# Patient Record
Sex: MALE | Race: WHITE | URBAN - METROPOLITAN AREA
[De-identification: names, ages, dates, MRNs, and addresses within clinical notes are randomized per-mention and may not be internally consistent; named-entity substitution may affect disease eponyms.]

---

## 2019-09-05 ENCOUNTER — IMMUNIZATION (OUTPATIENT)
Dept: SOCIAL WORK | Facility: CLINIC | Age: 59
End: 2019-09-05
Payer: COMMERCIAL

## 2019-09-05 DIAGNOSIS — Z23 NEED FOR VACCINATION: ICD-10-CM

## 2019-09-05 PROCEDURE — 90686 IIV4 VACC NO PRSV 0.5 ML IM: CPT | Performed by: REGISTERED NURSE

## 2019-09-05 PROCEDURE — 90471 IMMUNIZATION ADMIN: CPT | Performed by: REGISTERED NURSE

## 2023-09-25 ENCOUNTER — OFFICE VISIT (OUTPATIENT)
Dept: URGENT CARE | Facility: CLINIC | Age: 63
End: 2023-09-25
Payer: COMMERCIAL

## 2023-09-25 VITALS
HEART RATE: 89 BPM | HEIGHT: 66 IN | RESPIRATION RATE: 16 BRPM | SYSTOLIC BLOOD PRESSURE: 110 MMHG | TEMPERATURE: 98 F | DIASTOLIC BLOOD PRESSURE: 68 MMHG | OXYGEN SATURATION: 97 % | BODY MASS INDEX: 27.48 KG/M2 | WEIGHT: 171 LBS

## 2023-09-25 DIAGNOSIS — S39.012A STRAIN OF LUMBAR REGION, INITIAL ENCOUNTER: ICD-10-CM

## 2023-09-25 PROCEDURE — 3078F DIAST BP <80 MM HG: CPT | Performed by: PHYSICIAN ASSISTANT

## 2023-09-25 PROCEDURE — 99203 OFFICE O/P NEW LOW 30 MIN: CPT | Performed by: PHYSICIAN ASSISTANT

## 2023-09-25 PROCEDURE — 3074F SYST BP LT 130 MM HG: CPT | Performed by: PHYSICIAN ASSISTANT

## 2023-09-25 RX ORDER — KETOROLAC TROMETHAMINE 30 MG/ML
30 INJECTION, SOLUTION INTRAMUSCULAR; INTRAVENOUS ONCE
Status: COMPLETED | OUTPATIENT
Start: 2023-09-25 | End: 2023-09-25

## 2023-09-25 RX ORDER — METHYLPREDNISOLONE 4 MG/1
TABLET ORAL
Qty: 21 TABLET | Refills: 0 | Status: SHIPPED | OUTPATIENT
Start: 2023-09-25

## 2023-09-25 RX ADMIN — KETOROLAC TROMETHAMINE 30 MG: 30 INJECTION, SOLUTION INTRAMUSCULAR; INTRAVENOUS at 10:58

## 2023-09-25 ASSESSMENT — ENCOUNTER SYMPTOMS
PARESTHESIAS: 0
ABDOMINAL PAIN: 0
BACK PAIN: 1
PARESIS: 0
NUMBNESS: 0
BOWEL INCONTINENCE: 0
WEAKNESS: 0
HEADACHES: 0
TINGLING: 0
WEIGHT LOSS: 0
PERIANAL NUMBNESS: 0
LEG PAIN: 0
FEVER: 0

## 2023-09-25 NOTE — LETTER
September 25, 2023         Patient: Carlos Nicole   YOB: 1960   Date of Visit: 9/25/2023           To Whom it May Concern:    Carlos Nicole was seen in my clinic on 9/25/2023.  Please excuse patient's absence tomorrow.  He should only perform light duty for the next week to week and a half.    If you have any questions or concerns, please don't hesitate to call.        Sincerely,           Héctor Navarro P.A.-C.  Electronically Signed

## 2023-09-25 NOTE — PROGRESS NOTES
Subjective:   Carlos Nicole is a 63 y.o. male who presents today with   Chief Complaint   Patient presents with    Back Pain     Lower back pain on both side x 1 day       Back Pain  This is a new problem. The current episode started yesterday. The problem occurs constantly. The problem is unchanged. The pain is present in the lumbar spine. The quality of the pain is described as shooting. The pain does not radiate. Pertinent negatives include no abdominal pain, bladder incontinence, bowel incontinence, chest pain, dysuria, fever, headaches, leg pain, numbness, paresis, paresthesias, pelvic pain, perianal numbness, tingling, weakness or weight loss. He has tried NSAIDs (rest, back brace) for the symptoms. The treatment provided mild relief.     Patient states he bought a screen door that he was helping to install at home and when he went to lift it up he noticed pain in his lower back bilaterally.    PMH:  has no past medical history on file.  MEDS:   Current Outpatient Medications:     methylPREDNISolone (MEDROL DOSEPAK) 4 MG Tablet Therapy Pack, Follow schedule on package instructions., Disp: 21 Tablet, Rfl: 0    Current Facility-Administered Medications:     ketorolac (Toradol) injection 30 mg, 30 mg, Intramuscular, Once, ALEXYS OwenA.-CIRO.  ALLERGIES: No Known Allergies  SURGHX: No past surgical history on file.  SOCHX:  reports that he has never smoked. He has never used smokeless tobacco. He reports that he does not drink alcohol and does not use drugs.  FH: Reviewed with patient, not pertinent to this visit.       Review of Systems   Constitutional:  Negative for fever and weight loss.   Cardiovascular:  Negative for chest pain.   Gastrointestinal:  Negative for abdominal pain and bowel incontinence.   Genitourinary:  Negative for bladder incontinence, dysuria and pelvic pain.   Musculoskeletal:  Positive for back pain.   Neurological:  Negative for tingling, weakness, numbness, headaches and  "paresthesias.        Objective:   /68   Pulse 89   Temp 36.7 °C (98 °F) (Temporal)   Resp 16   Ht 1.676 m (5' 6\")   Wt 77.6 kg (171 lb)   SpO2 97%   BMI 27.60 kg/m²   Physical Exam  Vitals and nursing note reviewed.   Constitutional:       General: He is not in acute distress.     Appearance: Normal appearance. He is well-developed. He is not ill-appearing or toxic-appearing.   HENT:      Head: Normocephalic and atraumatic.      Right Ear: Hearing normal.      Left Ear: Hearing normal.   Cardiovascular:      Rate and Rhythm: Normal rate.   Pulmonary:      Effort: Pulmonary effort is normal.   Musculoskeletal:        Back:       Comments: Patient has no swelling or bruising noted to the lower back.  No midline spinous process tenderness.  Patient does have tenderness to palpation through the paraspinal lumbar area bilaterally.  Neurovascular intact distally.  Patient has discomfort with forward flexion at the hips as well as twisting.  No step-off deformity noted through the midline of the back.  Patient does note discomfort in the lumbar back when raising the lower extremities while standing.   Skin:     General: Skin is warm and dry.   Neurological:      Mental Status: He is alert.      Coordination: Coordination normal.   Psychiatric:         Mood and Affect: Mood normal.       Patient is wearing back brace at visit today but removes it for exam.    Assessment/Plan:   Assessment    1. Strain of lumbar region, initial encounter  - ketorolac (Toradol) injection 30 mg  - methylPREDNISolone (MEDROL DOSEPAK) 4 MG Tablet Therapy Pack; Follow schedule on package instructions.  Dispense: 21 Tablet; Refill: 0    Symptoms and presentation appear consistent with strain of the lumbar back.  No acute red flag signs.  No indication for imaging at this time.    Patient understands possible side effects of medication and to not take any further NSAIDs for at least 24 hours following Toradol shot today which she " tolerated well in clinic.  Patient states he works 2 jobs and I would recommend only performing light duty for the next week to 2 weeks.  Should avoid any heavy lifting or straining.  I would recommend use of lidocaine or Salonpas patches per 's instructions as well as rest and heat/ice to the area with gentle range of motion stretching and exercises.  Patient will only use steroid if symptoms persist into tomorrow.    Differential diagnosis, natural history, supportive care, and indications for immediate follow-up discussed.   Patient given instructions and understanding of medications and treatment.    If not improving in 3-5 days, F/U with PCP or return to UC if symptoms worsen.  Strict ER precautions given.  Patient agreeable to plan.      Please note that this dictation was created using voice recognition software. I have made every reasonable attempt to correct obvious errors, but I expect that there are errors of grammar and possibly content that I did not discover before finalizing the note.    Héctor Navarro PA-C

## 2023-09-25 NOTE — LETTER
September 25, 2023         Patient: Carlos Nicole   YOB: 1960   Date of Visit: 9/25/2023           To Whom it May Concern:    Carlos Nicole was seen in my clinic on 9/25/2023.  Please excuse patient's absence today.  He can return to work but should only perform light duty for the next week to week and a half.    If you have any questions or concerns, please don't hesitate to call.        Sincerely,           Héctor Navarro P.A.-C.  Electronically Signed